# Patient Record
(demographics unavailable — no encounter records)

---

## 2024-10-07 NOTE — DISCUSSION/SUMMARY
[FreeTextEntry1] : Mr. Powers presents for follow-up CAT scan from a nodule from September 2023.  The nodule stable for 6 months and this was a follow-up CAT scan 1 year.  The official report is not ready however I reviewed the CAT scan with the patient and the 7 mm right middle lobe pleural-based nodule seems unchanged.  To me it looks more pleural thickening rather than a nodule.  In a patient who quit smoking 23 years ago it is stable after 18 months it is unlikely to need follow-up CAT scan.  We await official radiology interpretation and further recommendations will be made.  This been explained to the patient he understands and agrees. The patient understands and agrees with plan of care. Today's office visit encompassed 32 minutes. I conducted an extensive history, physical exam and reviewed diagnosis and treatment options including diagnostic tests,radiology studies including cat scans and the use of prescription medication.

## 2024-10-07 NOTE — HISTORY OF PRESENT ILLNESS
[Former] : former [Never] : never [Obstructive Sleep Apnea] : obstructive sleep apnea [CPAP:] : CPAP [Nasal pillow] : nasal pillow [TextBox_4] : 60 male  quit tobacco 23 y ago  for fu re nodule on ct chest sept 2023 feels good no sob no cough no . no chest pain no tightness  no wt loss no hemoptysis no new cancer dx  ret public works [TextBox_11] : 1 [TextBox_13] : 15 [YearQuit] : 2000 [TextBox_158] : [facility] [TextBox_165] : Pt being treated by Sleep Physician

## 2024-10-07 NOTE — REASON FOR VISIT
[Follow-Up] : a follow-up visit [Pulmonary Nodules] : pulmonary nodules [TextEntry] : CT scan follow up for pulmonary nodules Denies  SOB, MCCOY, tachypnea, orthopnea, cough Pt does have Sleep Apnea and uses CPAP nightly sees Sleep Physician

## 2024-12-06 NOTE — PHYSICAL EXAM
[No JVD] : no jugular venous distention [No Respiratory Distress] : no respiratory distress  [Normal Gait] : normal gait [Alert and Oriented x3] : oriented to person, place, and time

## 2024-12-06 NOTE — HISTORY OF PRESENT ILLNESS
[FreeTextEntry1] : Patient presents for follow up for Zepbound. [de-identified] : 60M is here for follow up. He is on Zepbound without side effects. Denies abdominal pain

## 2024-12-06 NOTE — PLAN
[FreeTextEntry1] : BP in office within goal range  BP goal <140/80  healthy diet and physical activity as tolerated continue HCTZ and Crestor  Zepbound refilled  will request most recent cardio note and labs from Dr. Li

## 2025-03-06 NOTE — HISTORY OF PRESENT ILLNESS
[FreeTextEntry8] : Patient presents for possible pink eye in left eye. patient thought it would go away but seems to be getting worse. Patient would also like to discuss MMR vaccine, had titers done in 2020 and was low on mumps vaccine. Just wants to know if needs the vaccine.  60 year old male presenting in office with complaints of painful red left eye with discharge. He has tried saline eye rinses with little relief. In addition, he would like MMR vaccine, had titers checked 2 years ago and was told MMR was equivocal as well as pna vaccine.

## 2025-03-06 NOTE — PLAN
[FreeTextEntry1] : bacterial conjunctivitis recommend using warm compresses 4-5 timse daily with clean wash rag or paper towel okay to use clean bottle of eye drops to help with irritation start antibiotic eye drop q 8 hours she will RTO if symptoms worsen, vision changes occur, or swelling worsens around eye

## 2025-07-08 NOTE — HISTORY OF PRESENT ILLNESS
[Home] : at home, [unfilled] , at the time of the visit. [Medical Office: ([facility])___] : at the medical office located in  [Telephone (audio)] : This telephonic visit was provided via audio only technology. [Verbal consent obtained from patient] : the patient, [unfilled] [FreeTextEntry1] : weight loss  [de-identified] : Verbal consent was obtained from patient for telehealth on 7/8/25  States he had recent blood work per cardiologist and had protein in labs and was stitched from statin to Repatha. Denies muscle pain.

## 2025-07-08 NOTE — PLAN
[FreeTextEntry1] : Zepbound refilled  diet as tolerated  healthy diet and physical activity as tolerated  requested cardio labs  increase water intake  continue all medications as directed  RTO as routine for follow up.